# Patient Record
Sex: FEMALE | Race: WHITE
[De-identification: names, ages, dates, MRNs, and addresses within clinical notes are randomized per-mention and may not be internally consistent; named-entity substitution may affect disease eponyms.]

---

## 2020-01-29 ENCOUNTER — HOSPITAL ENCOUNTER (OUTPATIENT)
Dept: HOSPITAL 47 - EC | Age: 60
Setting detail: OBSERVATION
LOS: 1 days | Discharge: HOME | End: 2020-01-30
Attending: HOSPITALIST | Admitting: HOSPITALIST
Payer: COMMERCIAL

## 2020-01-29 DIAGNOSIS — D68.51: ICD-10-CM

## 2020-01-29 DIAGNOSIS — Z79.899: ICD-10-CM

## 2020-01-29 DIAGNOSIS — R07.89: Primary | ICD-10-CM

## 2020-01-29 DIAGNOSIS — Z90.710: ICD-10-CM

## 2020-01-29 DIAGNOSIS — E78.5: ICD-10-CM

## 2020-01-29 DIAGNOSIS — I20.0: ICD-10-CM

## 2020-01-29 DIAGNOSIS — N95.1: ICD-10-CM

## 2020-01-29 DIAGNOSIS — Z79.82: ICD-10-CM

## 2020-01-29 DIAGNOSIS — Z88.2: ICD-10-CM

## 2020-01-29 DIAGNOSIS — Z85.42: ICD-10-CM

## 2020-01-29 LAB
ALBUMIN SERPL-MCNC: 4.4 G/DL (ref 3.5–5)
ALP SERPL-CCNC: 66 U/L (ref 38–126)
ALT SERPL-CCNC: 27 U/L (ref 4–34)
ANION GAP SERPL CALC-SCNC: 8 MMOL/L
APTT BLD: 24 SEC (ref 22–30)
AST SERPL-CCNC: 30 U/L (ref 14–36)
BASOPHILS # BLD AUTO: 0.1 K/UL (ref 0–0.2)
BASOPHILS NFR BLD AUTO: 1 %
BUN SERPL-SCNC: 16 MG/DL (ref 7–17)
CALCIUM SPEC-MCNC: 9.7 MG/DL (ref 8.4–10.2)
CHLORIDE SERPL-SCNC: 105 MMOL/L (ref 98–107)
CK SERPL-CCNC: 124 U/L (ref 30–135)
CO2 SERPL-SCNC: 26 MMOL/L (ref 22–30)
EOSINOPHIL # BLD AUTO: 0.2 K/UL (ref 0–0.7)
EOSINOPHIL NFR BLD AUTO: 2 %
ERYTHROCYTE [DISTWIDTH] IN BLOOD BY AUTOMATED COUNT: 4.81 M/UL (ref 3.8–5.4)
ERYTHROCYTE [DISTWIDTH] IN BLOOD: 12.6 % (ref 11.5–15.5)
GLUCOSE SERPL-MCNC: 126 MG/DL (ref 74–99)
HCT VFR BLD AUTO: 42.5 % (ref 34–46)
HGB BLD-MCNC: 14.1 GM/DL (ref 11.4–16)
INR PPP: 0.9 (ref ?–1.2)
LYMPHOCYTES # SPEC AUTO: 1.7 K/UL (ref 1–4.8)
LYMPHOCYTES NFR SPEC AUTO: 20 %
MAGNESIUM SPEC-SCNC: 2 MG/DL (ref 1.6–2.3)
MCH RBC QN AUTO: 29.4 PG (ref 25–35)
MCHC RBC AUTO-ENTMCNC: 33.3 G/DL (ref 31–37)
MCV RBC AUTO: 88.4 FL (ref 80–100)
MONOCYTES # BLD AUTO: 0.4 K/UL (ref 0–1)
MONOCYTES NFR BLD AUTO: 4 %
NEUTROPHILS # BLD AUTO: 5.9 K/UL (ref 1.3–7.7)
NEUTROPHILS NFR BLD AUTO: 71 %
PLATELET # BLD AUTO: 314 K/UL (ref 150–450)
POTASSIUM SERPL-SCNC: 4.4 MMOL/L (ref 3.5–5.1)
PROT SERPL-MCNC: 7 G/DL (ref 6.3–8.2)
PT BLD: 9.5 SEC (ref 9–12)
SODIUM SERPL-SCNC: 139 MMOL/L (ref 137–145)
WBC # BLD AUTO: 8.3 K/UL (ref 3.8–10.6)

## 2020-01-29 PROCEDURE — 71275 CT ANGIOGRAPHY CHEST: CPT

## 2020-01-29 PROCEDURE — 84484 ASSAY OF TROPONIN QUANT: CPT

## 2020-01-29 PROCEDURE — 85610 PROTHROMBIN TIME: CPT

## 2020-01-29 PROCEDURE — 93005 ELECTROCARDIOGRAM TRACING: CPT

## 2020-01-29 PROCEDURE — 82550 ASSAY OF CK (CPK): CPT

## 2020-01-29 PROCEDURE — 83735 ASSAY OF MAGNESIUM: CPT

## 2020-01-29 PROCEDURE — 71046 X-RAY EXAM CHEST 2 VIEWS: CPT

## 2020-01-29 PROCEDURE — 83880 ASSAY OF NATRIURETIC PEPTIDE: CPT

## 2020-01-29 PROCEDURE — 85730 THROMBOPLASTIN TIME PARTIAL: CPT

## 2020-01-29 PROCEDURE — 36415 COLL VENOUS BLD VENIPUNCTURE: CPT

## 2020-01-29 PROCEDURE — 96360 HYDRATION IV INFUSION INIT: CPT

## 2020-01-29 PROCEDURE — 80061 LIPID PANEL: CPT

## 2020-01-29 PROCEDURE — 96361 HYDRATE IV INFUSION ADD-ON: CPT

## 2020-01-29 PROCEDURE — 93351 STRESS TTE COMPLETE: CPT

## 2020-01-29 PROCEDURE — 99285 EMERGENCY DEPT VISIT HI MDM: CPT

## 2020-01-29 PROCEDURE — 85025 COMPLETE CBC W/AUTO DIFF WBC: CPT

## 2020-01-29 PROCEDURE — 80053 COMPREHEN METABOLIC PANEL: CPT

## 2020-01-29 RX ADMIN — NITROGLYCERIN SCH: 20 OINTMENT TOPICAL at 17:35

## 2020-01-29 RX ADMIN — NITROGLYCERIN SCH INCH: 20 OINTMENT TOPICAL at 12:53

## 2020-01-29 NOTE — XR
EXAMINATION TYPE: XR chest 2V

 

DATE OF EXAM: 1/29/2020

 

COMPARISON: NONE

 

HISTORY: Left-sided chest pain and extremity weakness

 

TECHNIQUE:  Frontal and lateral views of the chest are obtained.

 

FINDINGS:  There is no focal air space opacity, pleural effusion, or pneumothorax seen.  The cardiac 
silhouette size is within normal limits.   The osseous structures are intact. Mild multilevel degener
ative change of the spine.

 

IMPRESSION:  No acute cardiopulmonary process.

## 2020-01-29 NOTE — ED
Chest Pain HPI





- General


Chief Complaint: Chest Pain


Stated Complaint: Chest pain


Time Seen by Provider: 01/29/20 09:03


Source: patient, RN notes reviewed


Mode of arrival: wheelchair


Limitations: no limitations





- History of Present Illness


Initial Comments: 





This is a 59-year-old female with a benign past





History and no family history of heart disease and her age range who presents 

with complaints of the onset last evening of retrosternal chest pressure 

mild-to-moderate in severity with some radiation down her left arm.  She states 

it got better throughout the afternoon and evening but then recurred again this 

morning.  She presented with mild 5/10 pain she said last night was 7 and 8/10. 

Of note he did go away shortly after arrival in emergency department prior to 

any medications being given.  She does take low-dose aspirin she does have 

factor 5 disease.  She is a nonsmoker.  No known lung disease.  He had no fevers

chills sweats cough or other symptoms.


MD Complaint: chest pain





- Related Data


                                    Allergies











Allergy/AdvReac Type Severity Reaction Status Date / Time


 


sulfamethoxazole Allergy  Unknown Verified 01/29/20 08:59





[From Bactrim]     


 


trimethoprim [From Bactrim] Allergy  Unknown Verified 01/29/20 08:59














Review of Systems


ROS Statement: 


Those systems with pertinent positive or pertinent negative responses have been 

documented in the HPI.





ROS Other: All systems not noted in ROS Statement are negative.





EKG Findings





- EKG Results:


EKG: interpreted by JENN FIELDS, sinus rhythm, normal axis, normal QRS, normal 

ST/T, no acute changes (Normal sinus rhythm a 68 VA interval 1:30 QRS duration 

72 QT since QTC 44/429 no acute ST-T wave changes)





Past Medical History


Past Medical History: Hyperlipidemia


History of Any Multi-Drug Resistant Organisms: None Reported


Past Surgical History: Breast Surgery, Hysterectomy, Tonsillectomy


Past Psychological History: No Psychological Hx Reported


Smoking Status: Never smoker


Past Alcohol Use History: Occasional


Past Drug Use History: None Reported





General Exam





- General Exam Comments


Initial Comments: 





This is a well-developed well-nourished awake alert oriented 3 female


Limitations: no limitations


General appearance: alert, in no apparent distress


Head exam: Present: atraumatic, normocephalic, normal inspection


Eye exam: Present: normal appearance, PERRL, EOMI.  Absent: scleral icterus, 

conjunctival injection, periorbital swelling


ENT exam: Present: normal exam, mucous membranes moist


Neck exam: Present: normal inspection, full ROM, other (No stridor JVD or 

bruits).  Absent: tenderness, meningismus, lymphadenopathy


Respiratory exam: Present: normal lung sounds bilaterally.  Absent: respiratory 

distress, wheezes, rales, rhonchi, stridor


Cardiovascular Exam: Present: regular rate, normal rhythm, normal heart sounds. 

Absent: systolic murmur, diastolic murmur, rubs, gallop, clicks


GI/Abdominal exam: Present: soft, normal bowel sounds.  Absent: distended, 

tenderness, guarding, rebound, rigid, bruit, pulsatile mass


Extremities exam: Present: normal inspection, full ROM, normal capillary refill.

 Absent: tenderness, pedal edema, joint swelling, calf tenderness


Back exam: Present: normal inspection


Neurological exam: Present: alert, oriented X3, CN II-XII intact


Psychiatric exam: Present: normal affect, normal mood


Skin exam: Present: warm, dry, intact, normal color.  Absent: rash





Course


                                   Vital Signs











  01/29/20 01/29/20





  08:56 09:34


 


Temperature 97.5 F L 


 


Pulse Rate 71 67


 


Pulse Rate [  67





Cardiac Monitor  





]  


 


Respiratory 16 18





Rate  


 


Blood Pressure 179/83 121/74


 


O2 Sat by Pulse 97 99





Oximetry  














Chest Pain MDM





- MDM





I did review the imaging and report no acute findings.  Patient was reevaluated 

several occasions she had no further chest pain to report but then later did 

state she has some mild pressure.  In light of the presentation which is 

suspicious for angina she will be admitted I did discuss this with her and her 

 were in agreement.  I did discuss the case also with Dr. Simpson.





Disposition


Clinical Impression: 


 Chest pain, Unstable angina pectoris





Disposition: ADMITTED AS IP TO THIS [facility]


Condition: Stable


Referrals: 


King Lopes MD [Primary Care Provider] - 1-2 days

## 2020-01-29 NOTE — CT
EXAMINATION TYPE: CT angio chest

 

DATE OF EXAM: 1/29/2020

 

COMPARISON: None

 

HISTORY: Chest pain and bilateral arm numbness.

 

CT DLP: 313.6 mGycm

Automated exposure control for dose reduction was used.

 

CONTRAST: 

Performed with IV Contrast, patient injected with 68ml mL of Isovue 370.

 

Multiple axial sections were obtained from the thoracic inlet to the diaphragm with intravenous contr
ast Isovue 68 mL.

 

FINDINGS:

There are 3-D post processed images.

 

There is mild subsegmental atelectasis at the posterior lung bases. Heart size is normal. There is no
 pericardial effusion. There are no hilar masses. There is no mediastinal adenopathy. There is normal
 contrast opacification of the pulmonary arteries. There are no filling defects.

 

Thoracic aorta is intact. There is no aneurysm or dissection. The bony thorax appears intact.

 

 

Impression 

no evidence of pulmonary embolism. Mild subsegmental atelectasis at the posterior lung bases.

## 2020-01-29 NOTE — P.HPIM
History of Present Illness


H&P Date: 01/29/20


Chief Complaint: Chest pain





History of presenting complaint:


This is a very pleasant 59-year-old patient of Dr. King oLpes.  Rather 

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 

for which she takes a baby aspirin.  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

working temporally and she felt a pressure and also feels a pressure in the neck

and the back.  This lasted for a short time.  Subsequently she went home and 

went to bed felt okay.  This morning she when she got up she felt some numbness 

in the left arm and the neck.  Yesterday she had felt some perspiration.  There 

is no dizziness lightheadedness or shortness of breath.  Given injection of 

symptoms she decided to come in.  Otherwise patient rather active and walks 

quite a bit.  No prior cardiac history.





Review of systems:


GEN.: None


EYES: None


HEENT: None


NECK: None


RESPIRATORY: None


CARDIOVASCULAR: As above


GASTROINTESTINAL: None


GENITOURINARY: None


MUSCULOSKELETAL: None


LYMPHATICS: None


HEMATOLOGICAL: None  


PSYCHIATRY: None


NEUROLOGICAL: None





Past medical history to include:


Factor V [place] mutation





Social history:


.  Does not smoke.:  Occasionally.





Family history:


Reviewed, noncontributory to presentation





Physical examination:


VITAL SIGNS: 97.8, 78, 18, 124/70, 95% room air


GENERAL: BMI 26.6, sitting up in bed comfortable laying in bed watching te

levision.


EYES: Pupils equal.  Conjunctiva normal.


HEENT: External appearance of nose and ears normal, oral cavity grossly normal.


NECK: JVD not raised; masses not palpable.


HEART: First and second heart sounds are normal;  no edema.  


LUNGS: Respiratory rate normal; clear to auscultation.  


ABDOMEN: Soft,  nontender, liver spleen not palpable, no masses palpable.  


PSYCH: Alert and oriented x3;  mood  and affect normal.  


NEUROLOGICAL: Cranial nerves grossly intact; no facial asymmetry,   power and 

sensation grossly intact. 


LYMPHATICS: No lymph nodes palpable in the axilla and neck





INVESTIGATIONS, reviewed in the clinical context:


White count 8.3 bun 40.1 platelets 314 potassium 4.4 creatinine 0.69


Troponin I 2 negative


EKG tracing personally reviewed by me-normal sinus rhythm


Chest x-ray film personally reviewed by me-lung fields are clear





Assessment:


-This is a patient is a 2 days of intermittent chest pain with some radiation of

the left arm back.  Troponins are negative.  EKG is unremarkable.  Need to rule 

out underlying ischemia.


-We'll rule out a PE.





Plan:


Patient aspirin and Nitropaste.  computed tomography scan to rule out PE.  

Cardiology is consulted.  Care was discussed with the patient.





Past Medical History


Past Medical History: Hyperlipidemia


Additional Past Medical History / Comment(s): leiden factor 5


History of Any Multi-Drug Resistant Organisms: None Reported


Past Surgical History: Breast Surgery, Hysterectomy, Tonsillectomy


Past Anesthesia/Blood Transfusion Reactions: No Reported Reaction


Past Psychological History: No Psychological Hx Reported


Smoking Status: Never smoker


Past Alcohol Use History: Occasional


Past Drug Use History: None Reported





- Past Family History


  ** Mother


Family Medical History: No Reported History





Medications and Allergies


                                Home Medications











 Medication  Instructions  Recorded  Confirmed  Type


 


Aspirin EC [Ecotrin Low Dose] 81 mg PO DAILY 01/29/20 01/29/20 History


 


L.acidoph,Paracasei, B.lactis 1 cap PO DAILY 01/29/20 01/29/20 History





[Probiotic]    


 


Multivitamins, Thera [Multivitamin 1 tab PO DAILY 01/29/20 01/29/20 History





(formulary)]    








                                    Allergies











Allergy/AdvReac Type Severity Reaction Status Date / Time


 


sulfamethoxazole Allergy  Unknown Verified 01/29/20 13:38





[From Bactrim]     


 


trimethoprim [From Bactrim] Allergy  Unknown Verified 01/29/20 11:42














Physical Exam


Vitals: 


                                   Vital Signs











  Temp Pulse Pulse Pulse Resp BP BP


 


 01/29/20 20:00  97.8 F    78  18   124/70


 


 01/29/20 15:39  97.6 F    77  18   124/67


 


 01/29/20 13:29  97.5 F L    64  18  


 


 01/29/20 12:55   68    18  129/76 


 


 01/29/20 11:00   56 L    16  127/67 


 


 01/29/20 10:00   61    15  121/74 


 


 01/29/20 09:34   67  67   18  121/74 


 


 01/29/20 09:10   60    15  158/78 


 


 01/29/20 08:56  97.5 F L  71    16  179/83 














  BP Pulse Ox


 


 01/29/20 20:00   95


 


 01/29/20 15:39   97


 


 01/29/20 13:29  137/77  99


 


 01/29/20 12:55   98


 


 01/29/20 11:00   100


 


 01/29/20 10:00   100


 


 01/29/20 09:34   99


 


 01/29/20 09:10   100


 


 01/29/20 08:56   97








                                Intake and Output











 01/29/20 01/29/20 01/29/20





 06:59 14:59 22:59


 


Other:   


 


  Voiding Method   Toilet


 


  Weight  72.575 kg 














Results


CBC & Chem 7: 


                                 01/29/20 09:14





                                 01/29/20 09:14


Labs: 


                  Abnormal Lab Results - Last 24 Hours (Table)











  01/29/20 Range/Units





  09:14 


 


Glucose  126 H  (74-99)  mg/dL














Thrombosis Risk Factor Assmnt





- Choose All That Apply


Any of the Below Risk Factors Present?: Yes


Each Factor Represents 1 point: Age 41-60 years, Obesity (BMI >25)


Thrombosis Risk Factor Assessment Total Risk Factor Score: 2


Thrombosis Risk Factor Assessment Level: Low Risk

## 2020-01-30 VITALS — RESPIRATION RATE: 18 BRPM | TEMPERATURE: 97.6 F | DIASTOLIC BLOOD PRESSURE: 71 MMHG | SYSTOLIC BLOOD PRESSURE: 118 MMHG

## 2020-01-30 VITALS — HEART RATE: 69 BPM

## 2020-01-30 LAB
CHOLEST SERPL-MCNC: 237 MG/DL (ref ?–200)
HDLC SERPL-MCNC: 49 MG/DL (ref 40–60)
LDLC SERPL CALC-MCNC: 142 MG/DL (ref 0–99)
TRIGL SERPL-MCNC: 232 MG/DL (ref ?–150)

## 2020-01-30 RX ADMIN — NITROGLYCERIN SCH: 20 OINTMENT TOPICAL at 00:26

## 2020-01-30 RX ADMIN — NITROGLYCERIN SCH: 20 OINTMENT TOPICAL at 04:24

## 2020-01-30 NOTE — P.CRDCN
History of Present Illness


History of present illness: 





HISTORY OF PRESENTING ILLNESS


This is a pleasant 59-year-old  female past medical history significant

for endometrial cancer status post hysterectomy 2018 and dyslipidemia.  He yoandy

es prior history of coronary artery disease and does not follow in the office 

with a cardiologist. We have been asked to see in consultation for chest pain.  

States her symptoms began 2 days ago while she was at work.  She works in a 

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  Throughout the course of the day she started feeling 

intermittent episodes of diaphoresis and generalized allover hot flashes.  She 

also felt excessively fatigued which is abnormal for her and she had also had a 

decent night's sleep the night before.  As the afternoon when she started to 

develop pressure in her chest that radiated through to the back and into the 

bilateral upper shoulder blades.  This was not associated with activity or exe

rtion.  Her symptoms were rather constant through the evening however she was 

able to fall asleep without difficulty.  She woke up on Wednesday morning with a

numb sensation in the left arm that radiated up into the left shoulder and the 

left neck.  She denies ever having had any associated shortness of breath, 

dizziness, nausea, vomiting or palpitations.  She had no further symptoms of 

chest discomfort however given the symptoms during the previous day she came in 

for further evaluation.  She is seen and examined resting comfortably in no 

acute distress.  Her symptoms have all completely resolved.  She does exercise 

by walking regularly and denies exertional chest pain.





DIAGNOSTICS


EKG reveals sinus mechanism with no acute ST or T wave abnormalities noted.


Chest xray negative for an acute cardiopulmonary process.


Laboratory reviewed, CBC unremarkable, sodium 139, potassium 4.4, creatinine 

0.69, magnesium 2.0, cardiac enzymes negative 3, NT proBNP 110, , HDL 

49, triglycerides 232 and total cholesterol 237.


Current cardiac medications include aspirin 81 mg daily. 





REVIEW OF SYSTEMS


At the time of my exam:


CONSTITUTIONAL: Denies fever or chills.


CARDIOVASCULAR: Denies chest pain, shortness of breath, orthopnea, PND or 

palpitations.


RESPIRATORY: Denies cough. 


GASTROINTESTINAL: Denies abdominal pain, diarrhea, constipation, nausea or 

vomiting.


MUSCULOSKELETAL: Denies myalgias.


NEUROLOGIC: Denies numbness, tingling or weakness.


ENDOCRINE: Denies fatigue, weight change,  polydipsia or polyurina.


GENITOURINARY: Denies burning, hematuria or urgency with micturation.


HEMATOLOGIC: Denies history of anemia or bleeding. 





PHYSICAL EXAMINATION


Blood pressure 126/69 heart rate 68 afebrile and maintaining oxygen saturation 

on room air.


CONSTITUTIONAL: No apparent distress. 


HEENT: Head is normocephalic. Pupils are equal, round. Sclerae anicteric. Mucous

membranes of the mouth are moist.  No JVD. No carotid bruit.


CHEST EXAMINATION: Lungs are clear to auscultation. No chest wall tenderness is 

noted on palpation or with deep breathing. 


HEART EXAMINATION: Regular rate and rhythm. S1, S2 heard. No murmurs, gallops or

rub.


ABDOMEN: Soft, nontender. Positive bowel sounds.


EXTREMITIES: 2+ peripheral pulses, no lower extremity edema and no calf 

tenderness.


NEUROLOGIC EXAMINATION: Patient is awake, alert and oriented x3. 





ASSESSMENT


Chest pain, atypical.  An acute coronary event has been ruled out.


Dyslipidemia





PLAN


An acute coronary event has been ruled out.


Perform stress echocardiogram to assess her stress induced cardiac ischemia.


Lifestyle modifications discussed and recommended for lowering of LDL 

cholesterol, optimally less than 100.  Advised patient to reassess her lipid 

profile in 3 months.


If stress test is normal she is stable for discharge from a cardiac perspective.


Thank you kindly for this consultation.








Nurse Practitioner note has been reviewed, I agree with a documented findings 

and plan of care.  Patient was seen and examined.











Past Medical History


Past Medical History: Hyperlipidemia


Additional Past Medical History / Comment(s): leiden factor 5


History of Any Multi-Drug Resistant Organisms: None Reported


Past Surgical History: Breast Surgery, Hysterectomy, Tonsillectomy


Past Anesthesia/Blood Transfusion Reactions: No Reported Reaction


Past Psychological History: No Psychological Hx Reported


Smoking Status: Never smoker


Past Alcohol Use History: Occasional


Past Drug Use History: None Reported





- Past Family History


  ** Mother


Family Medical History: No Reported History





Medications and Allergies


                                Home Medications











 Medication  Instructions  Recorded  Confirmed  Type


 


Aspirin EC [Ecotrin Low Dose] 81 mg PO DAILY 01/29/20 01/29/20 History


 


L.acidoph,Paracasei, B.lactis 1 cap PO DAILY 01/29/20 01/29/20 History





[Probiotic]    


 


Multivitamins, Thera [Multivitamin 1 tab PO DAILY 01/29/20 01/29/20 History





(formulary)]    








                                    Allergies











Allergy/AdvReac Type Severity Reaction Status Date / Time


 


sulfamethoxazole Allergy  Unknown Verified 01/29/20 13:38





[From Bactrim]     


 


trimethoprim [From Bactrim] Allergy  Unknown Verified 01/29/20 11:42














Physical Exam


Vitals: 


                                   Vital Signs











  Temp Pulse Pulse Pulse Resp BP BP


 


 01/30/20 07:43  97.8 F    68  16   126/69


 


 01/30/20 03:48  97.5 F L   69   16   120/71


 


 01/29/20 23:33  97.5 F L    73  17   125/71


 


 01/29/20 20:00  97.8 F    78  18   124/70


 


 01/29/20 15:39  97.6 F    77  18   124/67


 


 01/29/20 13:29  97.5 F L    64  18  


 


 01/29/20 12:55   68    18  129/76 


 


 01/29/20 11:00   56 L    16  127/67 


 


 01/29/20 10:00   61    15  121/74 


 


 01/29/20 09:34   67  67   18  121/74 


 


 01/29/20 09:10   60    15  158/78 


 


 01/29/20 08:56  97.5 F L  71    16  179/83 














  BP Pulse Ox


 


 01/30/20 07:43   98


 


 01/30/20 03:48   97


 


 01/29/20 23:33   96


 


 01/29/20 20:00   95


 


 01/29/20 15:39   97


 


 01/29/20 13:29  137/77  99


 


 01/29/20 12:55   98


 


 01/29/20 11:00   100


 


 01/29/20 10:00   100


 


 01/29/20 09:34   99


 


 01/29/20 09:10   100


 


 01/29/20 08:56   97








                                Intake and Output











 01/29/20 01/30/20 01/30/20





 22:59 06:59 14:59


 


Other:   


 


  Voiding Method Toilet Toilet 


 


  # Voids 2 1 














Results





                                 01/29/20 09:14





                                 01/29/20 09:14


                                 Cardiac Enzymes











  01/29/20 01/29/20 01/29/20 Range/Units





  09:14 09:14 15:43 


 


AST  30    (14-36)  U/L


 


Troponin I   <0.012  <0.012  (0.000-0.034)  ng/mL














  01/29/20 Range/Units





  21:01 


 


AST   (14-36)  U/L


 


Troponin I  <0.012  (0.000-0.034)  ng/mL








                                   Coagulation











  01/29/20 Range/Units





  09:14 


 


PT  9.5  (9.0-12.0)  sec


 


APTT  24.0  (22.0-30.0)  sec








                                     Lipids











  01/29/20 Range/Units





  09:14 


 


Triglycerides  232 H  (<150)  mg/dL


 


Cholesterol  237 H  (<200)  mg/dL


 


HDL Cholesterol  49  (40-60)  mg/dL








                                       CBC











  01/29/20 Range/Units





  09:14 


 


WBC  8.3  (3.8-10.6)  k/uL


 


RBC  4.81  (3.80-5.40)  m/uL


 


Hgb  14.1  (11.4-16.0)  gm/dL


 


Hct  42.5  (34.0-46.0)  %


 


Plt Count  314  (150-450)  k/uL








                          Comprehensive Metabolic Panel











  01/29/20 Range/Units





  09:14 


 


Sodium  139  (137-145)  mmol/L


 


Potassium  4.4  (3.5-5.1)  mmol/L


 


Chloride  105  ()  mmol/L


 


Carbon Dioxide  26  (22-30)  mmol/L


 


BUN  16  (7-17)  mg/dL


 


Creatinine  0.69  (0.52-1.04)  mg/dL


 


Glucose  126 H  (74-99)  mg/dL


 


Calcium  9.7  (8.4-10.2)  mg/dL


 


AST  30  (14-36)  U/L


 


ALT  27  (4-34)  U/L


 


Alkaline Phosphatase  66  ()  U/L


 


Total Protein  7.0  (6.3-8.2)  g/dL


 


Albumin  4.4  (3.5-5.0)  g/dL








                               Current Medications











Generic Name Dose Route Start Last Admin





  Trade Name Freq  PRN Reason Stop Dose Admin


 


Aspirin  325 mg  01/30/20 09:00 





  Aspirin  PO  





  DAILY YANE  


 


Miscellaneous Information  1 each  01/29/20 21:46 





  Rx Info: Iv Contrast Was Given  MISCELLANE  01/31/20 21:46 





  DAILY PRN  





  Per Protocol  


 


Nitroglycerin  0.4 mg  01/29/20 11:27 





  Nitrostat  SUBLINGUAL  





  Q5M PRN  





  Chest Pain  


 


Nitroglycerin  1 inch  01/29/20 12:00  01/30/20 04:24





  Nitro-Bid Oint  TOPICAL   Not Given





  Q6HR YANE  








                                Intake and Output











 01/29/20 01/30/20 01/30/20





 22:59 06:59 14:59


 


Other:   


 


  Voiding Method Toilet Toilet 


 


  # Voids 2 1 








                                        





                                 01/29/20 09:14 





                                 01/29/20 09:14

## 2020-01-30 NOTE — P.DS
Providers


Date of admission: 


01/29/20 11:25





Expected date of discharge: 01/30/20


Attending physician: 


Camden Simpson





Consults: 





                                        





01/29/20 11:27


Consult Physician Urgent 


   Consulting Provider: Matias Sylvester


   Consult Reason/Comments: Angina, chest pain


   Do you want consulting provider notified?: Yes











Primary care physician: 


King Lopes MD





Hospital Course: 





Chief Complaint: Chest pain





History of presenting complaint:


This is a very pleasant 59-year-old patient of Dr. King Lopes.  Rather 

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 

for which she takes a baby aspirin.  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

working temporally and she felt a pressure and also feels a pressure in the neck

and the back.  This lasted for a short time.  Subsequently she went home and 

went to bed felt okay.  This morning she when she got up she felt some numbness 

in the left arm and the neck.  Yesterday she had felt some perspiration.  There 

is no dizziness lightheadedness or shortness of breath.  Given injection of 

symptoms she decided to come in.  Otherwise patient rather active and walks 

quite a bit.  No prior cardiac history.


Patient underwent a stress test.  I was called with a verbal report from the 

nurse that stress test was negative.  Troponins were negative.  Cleared by 

cardiology.  Results conveyed to the patient.





Consultation:


Dr. Adamson from cardiology














Physical examination:


VITAL SIGNS: 97.6, 62, 18, 11 8/71, 99% room air


GENERAL: Sitting up, comfortable


EYES: Pupils equal.  Conjunctiva normal.


HEENT: External appearance of nose and ears normal, oral cavity grossly normal.


NECK: JVD not raised; masses not palpable.


HEART: First and second heart sounds are normal;  no edema.  


LUNGS: Respiratory rate normal; clear to auscultation.  


ABDOMEN: Soft,  nontender, liver spleen not palpable, no masses palpable.  


PSYCH: Alert and oriented x3;  mood  and affect normal.  








INVESTIGATIONS, reviewed in the clinical context:


White count 8.3 bun 40.1 platelets 314 potassium 4.4 creatinine 0.69


Troponin I 2 negative


EKG tracing personally reviewed by me-normal sinus rhythm


Chest x-ray film personally reviewed by me-lung fields are clear





Exercise stress echocardiogram-reported negative by cardiogenic


Chest CTA-negative for PE





Assessment:


-Anterior chest wall pain possibly musculoskeletal


Factor V [place] mutation


-PE ruled out





Disposition:


Home








Plan - Discharge Summary


Discharge Rx Participant: Yes


New Discharge Prescriptions: 


Continue


   Multivitamins, Thera [Multivitamin (formulary)] 1 tab PO DAILY


   Aspirin EC [Ecotrin Low Dose] 81 mg PO DAILY


   L.acidoph,Paracasei, B.lactis [Probiotic] 1 cap PO DAILY


Discharge Medication List





Aspirin EC [Ecotrin Low Dose] 81 mg PO DAILY 01/29/20 [History]


L.acidoph,Paracasei, B.lactis [Probiotic] 1 cap PO DAILY 01/29/20 [History]


Multivitamins, Thera [Multivitamin (formulary)] 1 tab PO DAILY 01/29/20 

[History]








Follow up Appointment(s)/Referral(s): 


cardiology, [Other] - 3 Weeks


King Lopes MD [Primary Care Provider] - 1-2 days


Discharge Disposition: HOME SELF-CARE

## 2020-01-31 NOTE — ECHOS
Stress Test Results/Findings: 

Exam Performed:  stress echo exercise

Exam Date: 01/30/20

Reason for Exam: CP



Height: 5 ft 5 in

Weight: 72.57 kg

Protocol: STRESS ECHO

Stage: 3

Duration of Exercise: 9:00



Resting Heart Rate: 70

Resting Blood Pressure: 150/76

Maximum Achieved Heart Rate: 146

Maximum Achieved Blood Pressure: 199/98

85% PMHR: 136

100% PMHR: 160

METS: 10.3

Technologist Comment: 



Stress Test Results/Findings: 

This is a 59-year-old female with history of hypercholesterolemia, being 
evaluated for symptoms of chest pain.



Stress data: Baseline EKG showed sinus rhythm with normal SD interval and QRS 
duration.  Blood pressure at rest is 150/76 with pulse rate of 70.  Patient 
walked on the Luke protocol for 9 minutes achieving a maximum heart rate of 146
with a blood pressure 199/98.  EKGs taken during and after the stress test did 
not reveal any significant changes from the baseline.



Echo data: Baseline echo images show normal wall motion and thickening.  
Exercise echo images showed augmentation of wall motion and thickening in all 
the segments.



Final impression: #1.  Negative stress test #2.  Negative stress echo.

KARON

## 2020-01-31 NOTE — P.STRESS
- Stress Test Note


Stress Test Results/Findings: 


Exam Performed:  stress echo exercise


Exam Date: 01/30/20


Reason for Exam: CP





Height: 5 ft 5 in


Weight: 72.57 kg


Protocol: STRESS ECHO


Stage: 3


Duration of Exercise: 9:00





Resting Heart Rate: 70


Resting Blood Pressure: 150/76


Maximum Achieved Heart Rate: 146


Maximum Achieved Blood Pressure: 199/98


85% PMHR: 136


100% PMHR: 160


METS: 10.3


Technologist Comment: 





Stress Test Results/Findings: 


This is a 59-year-old female with history of hypercholesterolemia, being 

evaluated for symptoms of chest pain.





Stress data: Baseline EKG showed sinus rhythm with normal CT interval and QRS 

duration.  Blood pressure at rest is 150/76 with pulse rate of 70.  Patient 

walked on the Luke protocol for 9 minutes achieving a maximum heart rate of 146

with a blood pressure 199/98.  EKGs taken during and after the stress test did 

not reveal any significant changes from the baseline.





Echo data: Baseline echo images show normal wall motion and thickening.  

Exercise echo images showed augmentation of wall motion and thickening in all 

the segments.





Final impression: #1.  Negative stress test #2.  Negative stress echo.